# Patient Record
Sex: FEMALE | Race: WHITE | Employment: FULL TIME | URBAN - METROPOLITAN AREA
[De-identification: names, ages, dates, MRNs, and addresses within clinical notes are randomized per-mention and may not be internally consistent; named-entity substitution may affect disease eponyms.]

---

## 2021-11-03 ENCOUNTER — APPOINTMENT (OUTPATIENT)
Dept: CT IMAGING | Age: 36
End: 2021-11-03
Payer: COMMERCIAL

## 2021-11-03 ENCOUNTER — HOSPITAL ENCOUNTER (OUTPATIENT)
Age: 36
Setting detail: OBSERVATION
Discharge: HOME | End: 2021-11-04
Attending: EMERGENCY MEDICINE | Admitting: SURGERY
Payer: COMMERCIAL

## 2021-11-03 DIAGNOSIS — K35.80 ACUTE APPENDICITIS, UNSPECIFIED ACUTE APPENDICITIS TYPE: ICD-10-CM

## 2021-11-03 DIAGNOSIS — K35.80 ACUTE APPENDICITIS WITHOUT PERITONITIS: Primary | ICD-10-CM

## 2021-11-03 LAB
A/G RATIO: 1.4 (ref 1.1–2.2)
ALBUMIN SERPL-MCNC: 4.4 G/DL (ref 3.4–5)
ALP BLD-CCNC: 49 U/L (ref 40–129)
ALT SERPL-CCNC: 15 U/L (ref 10–40)
ANION GAP SERPL CALCULATED.3IONS-SCNC: 9 MMOL/L (ref 3–16)
AST SERPL-CCNC: 15 U/L (ref 15–37)
BASOPHILS ABSOLUTE: 0 K/UL (ref 0–0.2)
BASOPHILS RELATIVE PERCENT: 0.2 %
BILIRUB SERPL-MCNC: 0.3 MG/DL (ref 0–1)
BILIRUBIN URINE: NEGATIVE
BLOOD, URINE: NEGATIVE
BUN BLDV-MCNC: 12 MG/DL (ref 7–20)
CALCIUM SERPL-MCNC: 9.7 MG/DL (ref 8.3–10.6)
CHLORIDE BLD-SCNC: 101 MMOL/L (ref 99–110)
CLARITY: ABNORMAL
CO2: 27 MMOL/L (ref 21–32)
COLOR: YELLOW
CREAT SERPL-MCNC: 0.8 MG/DL (ref 0.6–1.1)
EOSINOPHILS ABSOLUTE: 0 K/UL (ref 0–0.6)
EOSINOPHILS RELATIVE PERCENT: 0.2 %
GFR AFRICAN AMERICAN: >60
GFR NON-AFRICAN AMERICAN: >60
GLUCOSE BLD-MCNC: 144 MG/DL (ref 70–99)
GLUCOSE URINE: NEGATIVE MG/DL
HCG QUALITATIVE: NEGATIVE
HCT VFR BLD CALC: 39.1 % (ref 36–48)
HEMOGLOBIN: 13.2 G/DL (ref 12–16)
KETONES, URINE: NEGATIVE MG/DL
LEUKOCYTE ESTERASE, URINE: NEGATIVE
LIPASE: 26 U/L (ref 13–60)
LYMPHOCYTES ABSOLUTE: 1.3 K/UL (ref 1–5.1)
LYMPHOCYTES RELATIVE PERCENT: 8.6 %
MCH RBC QN AUTO: 29.9 PG (ref 26–34)
MCHC RBC AUTO-ENTMCNC: 33.8 G/DL (ref 31–36)
MCV RBC AUTO: 88.4 FL (ref 80–100)
MICROSCOPIC EXAMINATION: ABNORMAL
MONOCYTES ABSOLUTE: 0.7 K/UL (ref 0–1.3)
MONOCYTES RELATIVE PERCENT: 4.5 %
NEUTROPHILS ABSOLUTE: 13.1 K/UL (ref 1.7–7.7)
NEUTROPHILS RELATIVE PERCENT: 86.5 %
NITRITE, URINE: NEGATIVE
PDW BLD-RTO: 13.9 % (ref 12.4–15.4)
PH UA: 6.5 (ref 5–8)
PLATELET # BLD: 295 K/UL (ref 135–450)
PMV BLD AUTO: 8.3 FL (ref 5–10.5)
POTASSIUM SERPL-SCNC: 3.8 MMOL/L (ref 3.5–5.1)
PROTEIN UA: NEGATIVE MG/DL
RBC # BLD: 4.43 M/UL (ref 4–5.2)
SODIUM BLD-SCNC: 137 MMOL/L (ref 136–145)
SPECIFIC GRAVITY UA: 1.01 (ref 1–1.03)
TOTAL PROTEIN: 7.5 G/DL (ref 6.4–8.2)
URINE TYPE: ABNORMAL
UROBILINOGEN, URINE: 0.2 E.U./DL
WBC # BLD: 15.2 K/UL (ref 4–11)

## 2021-11-03 PROCEDURE — 96361 HYDRATE IV INFUSION ADD-ON: CPT

## 2021-11-03 PROCEDURE — 81003 URINALYSIS AUTO W/O SCOPE: CPT

## 2021-11-03 PROCEDURE — G0378 HOSPITAL OBSERVATION PER HR: HCPCS

## 2021-11-03 PROCEDURE — 96365 THER/PROPH/DIAG IV INF INIT: CPT

## 2021-11-03 PROCEDURE — 96376 TX/PRO/DX INJ SAME DRUG ADON: CPT

## 2021-11-03 PROCEDURE — 99283 EMERGENCY DEPT VISIT LOW MDM: CPT

## 2021-11-03 PROCEDURE — 84703 CHORIONIC GONADOTROPIN ASSAY: CPT

## 2021-11-03 PROCEDURE — 2580000003 HC RX 258: Performed by: EMERGENCY MEDICINE

## 2021-11-03 PROCEDURE — 99220 PR INITIAL OBSERVATION CARE/DAY 70 MINUTES: CPT | Performed by: SURGERY

## 2021-11-03 PROCEDURE — 74177 CT ABD & PELVIS W/CONTRAST: CPT

## 2021-11-03 PROCEDURE — 83690 ASSAY OF LIPASE: CPT

## 2021-11-03 PROCEDURE — 6360000002 HC RX W HCPCS: Performed by: SURGERY

## 2021-11-03 PROCEDURE — 85025 COMPLETE CBC W/AUTO DIFF WBC: CPT

## 2021-11-03 PROCEDURE — 6360000002 HC RX W HCPCS: Performed by: EMERGENCY MEDICINE

## 2021-11-03 PROCEDURE — 96375 TX/PRO/DX INJ NEW DRUG ADDON: CPT

## 2021-11-03 PROCEDURE — 6360000004 HC RX CONTRAST MEDICATION: Performed by: EMERGENCY MEDICINE

## 2021-11-03 PROCEDURE — 80053 COMPREHEN METABOLIC PANEL: CPT

## 2021-11-03 RX ORDER — SODIUM CHLORIDE 0.9 % (FLUSH) 0.9 %
5-40 SYRINGE (ML) INJECTION PRN
Status: DISCONTINUED | OUTPATIENT
Start: 2021-11-03 | End: 2021-11-04 | Stop reason: HOSPADM

## 2021-11-03 RX ORDER — SODIUM CHLORIDE 0.9 % (FLUSH) 0.9 %
5-40 SYRINGE (ML) INJECTION EVERY 12 HOURS SCHEDULED
Status: DISCONTINUED | OUTPATIENT
Start: 2021-11-04 | End: 2021-11-04 | Stop reason: HOSPADM

## 2021-11-03 RX ORDER — MORPHINE SULFATE 4 MG/ML
4 INJECTION, SOLUTION INTRAMUSCULAR; INTRAVENOUS ONCE
Status: COMPLETED | OUTPATIENT
Start: 2021-11-03 | End: 2021-11-03

## 2021-11-03 RX ORDER — ONDANSETRON 2 MG/ML
4 INJECTION INTRAMUSCULAR; INTRAVENOUS EVERY 6 HOURS PRN
Status: DISCONTINUED | OUTPATIENT
Start: 2021-11-03 | End: 2021-11-04 | Stop reason: HOSPADM

## 2021-11-03 RX ORDER — ONDANSETRON 4 MG/1
4 TABLET, ORALLY DISINTEGRATING ORAL EVERY 8 HOURS PRN
Status: DISCONTINUED | OUTPATIENT
Start: 2021-11-03 | End: 2021-11-04 | Stop reason: HOSPADM

## 2021-11-03 RX ORDER — SODIUM CHLORIDE 9 MG/ML
25 INJECTION, SOLUTION INTRAVENOUS PRN
Status: DISCONTINUED | OUTPATIENT
Start: 2021-11-03 | End: 2021-11-04 | Stop reason: HOSPADM

## 2021-11-03 RX ORDER — 0.9 % SODIUM CHLORIDE 0.9 %
1000 INTRAVENOUS SOLUTION INTRAVENOUS ONCE
Status: COMPLETED | OUTPATIENT
Start: 2021-11-03 | End: 2021-11-03

## 2021-11-03 RX ORDER — ONDANSETRON 2 MG/ML
4 INJECTION INTRAMUSCULAR; INTRAVENOUS ONCE
Status: COMPLETED | OUTPATIENT
Start: 2021-11-03 | End: 2021-11-03

## 2021-11-03 RX ADMIN — SODIUM CHLORIDE 1000 ML: 9 INJECTION, SOLUTION INTRAVENOUS at 19:16

## 2021-11-03 RX ADMIN — MORPHINE SULFATE 4 MG: 4 INJECTION INTRAVENOUS at 20:57

## 2021-11-03 RX ADMIN — MORPHINE SULFATE 4 MG: 4 INJECTION INTRAVENOUS at 19:17

## 2021-11-03 RX ADMIN — HYDROMORPHONE HYDROCHLORIDE 0.5 MG: 1 INJECTION, SOLUTION INTRAMUSCULAR; INTRAVENOUS; SUBCUTANEOUS at 23:56

## 2021-11-03 RX ADMIN — ONDANSETRON 4 MG: 2 INJECTION INTRAMUSCULAR; INTRAVENOUS at 19:17

## 2021-11-03 RX ADMIN — PIPERACILLIN AND TAZOBACTAM 3375 MG: 3; .375 INJECTION, POWDER, LYOPHILIZED, FOR SOLUTION INTRAVENOUS at 20:56

## 2021-11-03 RX ADMIN — IOPAMIDOL 80 ML: 755 INJECTION, SOLUTION INTRAVENOUS at 20:19

## 2021-11-03 ASSESSMENT — PAIN DESCRIPTION - LOCATION
LOCATION: ABDOMEN
LOCATION: ABDOMEN

## 2021-11-03 ASSESSMENT — PAIN DESCRIPTION - PAIN TYPE
TYPE: ACUTE PAIN
TYPE: ACUTE PAIN

## 2021-11-03 ASSESSMENT — PAIN SCALES - GENERAL
PAINLEVEL_OUTOF10: 8
PAINLEVEL_OUTOF10: 4
PAINLEVEL_OUTOF10: 4
PAINLEVEL_OUTOF10: 8
PAINLEVEL_OUTOF10: 8
PAINLEVEL_OUTOF10: 7
PAINLEVEL_OUTOF10: 6

## 2021-11-03 ASSESSMENT — PAIN DESCRIPTION - DESCRIPTORS
DESCRIPTORS: STABBING;PINS AND NEEDLES
DESCRIPTORS: SHARP;STABBING

## 2021-11-03 ASSESSMENT — PAIN DESCRIPTION - FREQUENCY: FREQUENCY: CONTINUOUS

## 2021-11-03 ASSESSMENT — PAIN DESCRIPTION - PROGRESSION: CLINICAL_PROGRESSION: NOT CHANGED

## 2021-11-03 ASSESSMENT — PAIN DESCRIPTION - ONSET: ONSET: ON-GOING

## 2021-11-03 ASSESSMENT — PAIN - FUNCTIONAL ASSESSMENT: PAIN_FUNCTIONAL_ASSESSMENT: ACTIVITIES ARE NOT PREVENTED

## 2021-11-03 ASSESSMENT — PAIN DESCRIPTION - ORIENTATION
ORIENTATION: RIGHT;MID
ORIENTATION: UPPER;LEFT;RIGHT

## 2021-11-03 ASSESSMENT — PAIN DESCRIPTION - DIRECTION: RADIATING_TOWARDS: ALL OVER ABD

## 2021-11-03 NOTE — ED PROVIDER NOTES
of Exercise per Week:     Minutes of Exercise per Session:    Stress:     Feeling of Stress :    Social Connections:     Frequency of Communication with Friends and Family:     Frequency of Social Gatherings with Friends and Family:     Attends Church Services:     Active Member of Clubs or Organizations:     Attends Club or Organization Meetings:     Marital Status:    Intimate Partner Violence:     Fear of Current or Ex-Partner:     Emotionally Abused:     Physically Abused:     Sexually Abused:        Nursing Notes Reviewed      ROS:  GENERAL:  No fever, no chills, no diaphoresis, no appetite changes  EYES: no eye discharge, no eye redness, no visual changes  ENT: no nasal congestion, no sore throat  CARDIAC: no chest pain, no palpitations, no leg swelling  PULM: no cough, no shortness of breath  ABD: no abdominal pain, no nausea, no vomiting, no diarrhea, no melena or hematochezia  : no dysuria, no hematuria, no urgency, no frequency. No flank pain  MUSCULOSKELETAL: no back pain, no arthralgias, no myalgias  NEURO: no headache, no lightheadedness, no dizziness, no numbness, no weakness, no syncope, no confusion, no speech difficulty  SKIN: no rashes, no erythema, no wounds, no ecchymosis      PHYSICAL EXAM:  GENERAL APPEARANCE: [place] is in no acute respiratory distress. Awake and alert. VITAL SIGNS:   ED Triage Vitals [11/03/21 1839]   Enc Vitals Group      /64      Pulse 63      Resp 14      Temp 98.3 °F (36.8 °C)      Temp Source Oral      SpO2 100 %      Weight 138 lb 3.2 oz (62.7 kg)      Height 5' 8\" (1.727 m)      Head Circumference       Peak Flow       Pain Score       Pain Loc       Pain Edu? Excl. in [address]? HEAD: Normocephalic, atraumatic. EYES:  Extraocular muscles are intact. Pupils equal round and reactive to light. Conjunctivas are pink. Negative scleral icterus. ENT:  Mucous membranes are moist.  Pharynx without erythema or exudates.    NECK: Nontender and supple. No cervical adenopathy. CHEST:  Clear to auscultation bilaterally. No rales, rhonchi, or wheezing. HEART:  Regular rate and regular rhythm. No murmurs. Strong and equal pulses in the upper and lower extremities. ABDOMEN: Soft,  nondistended, positive bowel sounds. abdomen is tender in RLQ greater than upper abdomen. No rebound. no guarding. MUSCULOSKELETAL: The calves are nontender to palpation. Active range of motion of the upper and lower extremities. No edema. NEUROLOGICAL: Awake, alert and oriented x 3. Power intact in the upper and lower extremities. DERMATOLOGIC: No petechiae, rashes, or ecchymoses. No erythema. PSYCH: normal mood and affect. Normal thought content. ED COURSE AND MEDICAL DECISION MAKING:      Radiology:  Films have been read by radiologist as noted in chart unless otherwise stated. Other radiologic studies (i.e. CT, MRI, ultrasounds, etc ) have been interpreted by radiologist.     CT ABDOMEN PELVIS W IV CONTRAST Additional Contrast? None   Final Result      1. Evidence of appendicitis. 2. Retroverted uterus with prominent thickened endometrium. 3. Trace pelvic fluid. CT ABDOMEN PELVIS W IV CONTRAST Additional Contrast? None    Result Date: 11/3/2021  DATE: 11/3/2021 EXAM: CT ABDOMEN PELVIS W IV CONTRAST INDICATION: abd pain, upper, now localizing more to RLQ, Pain COMPARISON: None. TECHNIQUE: Axial CT imaging obtained from lung bases through pelvis. Axial images and multiplanar reformatted images are provided for review. Individualized dose optimization technique was used in order to meet ALARA standards for radiation dose reduction. In addition to vendor specific dose reduction algorithms, the dose reduction techniques vary based on the specific scanner utilized but frequently include automated exposure control, adjustment of the mA and/or kV according to patient size, and use of iterative reconstruction technique.  IV Contrast: 80 mL Isovue-370 Oral Contrast: No FINDINGS: LUNG BASES: Clear. LIVER: Normal. GALLBLADDER AND BILIARY TREE: No calcified gallstones. No gallbladder distention. No intra- or extrahepatic biliary dilatation. PANCREAS: Normal. SPLEEN: Normal. ADRENAL GLANDS: Normal. KIDNEYS AND URETERS: Symmetrical enhancement. No hydronephrosis. URINARY BLADDER: Normal. REPRODUCTIVE ORGANS: Uterus is retroverted. Prominent thickened endometrium. BOWEL: No bowel dilatation. No evidence of obstruction. The appendix is mildly dilated and thickened, measuring up to 9 mm in diameter, with mild surrounding inflammatory density. No fluid collection or free air LYMPH NODES: No abnormally enlarged nodes. PERITONEUM/RETROPERITONEUM: Trace amount of pelvic fluid. VESSELS: Aorta is normal caliber and proximal branch vessels are patent. ABDOMINAL WALL: Normal. BONES: No destructive process. 1. Evidence of appendicitis. 2. Retroverted uterus with prominent thickened endometrium. 3. Trace pelvic fluid.        Labs:  Results for orders placed or performed during the hospital encounter of 11/03/21   CBC Auto Differential   Result Value Ref Range    WBC 15.2 (H) 4.0 - 11.0 K/uL    RBC 4.43 4.00 - 5.20 M/uL    Hemoglobin 13.2 12.0 - 16.0 g/dL    Hematocrit 39.1 36.0 - 48.0 %    MCV 88.4 80.0 - 100.0 fL    MCH 29.9 26.0 - 34.0 pg    MCHC 33.8 31.0 - 36.0 g/dL    RDW 13.9 12.4 - 15.4 %    Platelets 484 494 - 661 K/uL    MPV 8.3 5.0 - 10.5 fL    Neutrophils % 86.5 %    Lymphocytes % 8.6 %    Monocytes % 4.5 %    Eosinophils % 0.2 %    Basophils % 0.2 %    Neutrophils Absolute 13.1 (H) 1.7 - 7.7 K/uL    Lymphocytes Absolute 1.3 1.0 - 5.1 K/uL    Monocytes Absolute 0.7 0.0 - 1.3 K/uL    Eosinophils Absolute 0.0 0.0 - 0.6 K/uL    Basophils Absolute 0.0 0.0 - 0.2 K/uL   Lipase   Result Value Ref Range    Lipase 26.0 13.0 - 60.0 U/L   Comprehensive Metabolic Panel   Result Value Ref Range    Sodium 137 136 - 145 mmol/L    Potassium 3.8 3.5 - 5.1 mmol/L    Chloride 101 99 - 110 mmol/L    CO2 27 21 - 32 mmol/L    Anion Gap 9 3 - 16    Glucose 144 (H) 70 - 99 mg/dL    BUN 12 7 - 20 mg/dL    CREATININE 0.8 0.6 - 1.1 mg/dL    GFR Non-African American >60 >60    GFR African American >60 >60    Calcium 9.7 8.3 - 10.6 mg/dL    Total Protein 7.5 6.4 - 8.2 g/dL    Albumin 4.4 3.4 - 5.0 g/dL    Albumin/Globulin Ratio 1.4 1.1 - 2.2    Total Bilirubin 0.3 0.0 - 1.0 mg/dL    Alkaline Phosphatase 49 40 - 129 U/L    ALT 15 10 - 40 U/L    AST 15 15 - 37 U/L   Urinalysis   Result Value Ref Range    Color, UA Yellow Straw/Yellow    Clarity, UA SL CLOUDY (A) Clear    Glucose, Ur Negative Negative mg/dL    Bilirubin Urine Negative Negative    Ketones, Urine Negative Negative mg/dL    Specific Gravity, UA 1.015 1.005 - 1.030    Blood, Urine Negative Negative    pH, UA 6.5 5.0 - 8.0    Protein, UA Negative Negative mg/dL    Urobilinogen, Urine 0.2 <2.0 E.U./dL    Nitrite, Urine Negative Negative    Leukocyte Esterase, Urine Negative Negative    Microscopic Examination Not Indicated     Urine Type NotGiven    HCG Qualitative, Serum   Result Value Ref Range    hCG Qual Negative Detects HCG level >10 MIU/mL       Treatment in the department:  Patient received the following while in the ED. Medications   morphine injection 4 mg (4 mg IntraVENous Given 11/3/21 1917)   ondansetron (ZOFRAN) injection 4 mg (4 mg IntraVENous Given 11/3/21 1917)   0.9 % sodium chloride bolus (0 mLs IntraVENous Stopped 11/3/21 2016)   iopamidol (ISOVUE-370) 76 % injection 80 mL (80 mLs IntraVENous Given 11/3/21 2019)   piperacillin-tazobactam (ZOSYN) 3,375 mg in dextrose 5 % 50 mL IVPB (mini-bag) (3,375 mg IntraVENous New Bag 11/3/21 2056)   morphine injection 4 mg (4 mg IntraVENous Given 11/3/21 2057)         Medical decision making:  Patient presents with 1 day history of abdominal pain. This seemed to start in the upper abdomen but now localizing to the right lower quadrant. She is afebrile but does have leukocytosis.   CT imaging did show signs of acute appendicitis without perforation or obstruction or free air. Patient was started on empiric antibiotics and kept n.p.o.  Surgery was contacted. I spoke with Dr. Freda Yung. We thoroughly discussed the history, physical exam, laboratory and imaging studies, as well as, emergency department course. Based upon that discussion, we've decided to admit Anayeli Brandon for further observation and evaluation of Beryan Hernandesal Tyrghulam's abdominal pain. As I have deemed necessary from their history, physical and studies, I have considered and evaluated Anayeli Brandon for the following diagnoses:  ACUTE APPENDICITIS, BOWEL OBSTRUCTION, CHOLECYSTITIS, DIVERTICULITIS, INCARCERATED HERNIA, PANCREATITIS, or PERFORATED BOWEL or ULCER, AAA, OBSTRUCTIVE UROPATHY, ISCHEMIC COLITIS. Clinical Impression:  1. Acute appendicitis without peritonitis        Dispo:  Patient will be admitted at this time. Patient was informed of this decision and agrees with plan. I have discussed lab and xray findings with patient and they understand. Questions were answered to the best of my ability. Discharge vitals:  Blood pressure 109/61, pulse 68, temperature 98.3 °F (36.8 °C), temperature source Oral, resp. rate 12, height 5' 8\" (1.727 m), weight 138 lb 3.2 oz (62.7 kg), last menstrual period 09/03/2021, SpO2 99 %. Prescriptions given:   New Prescriptions    No medications on file       This chart was created using Dragon voice recognition software.         Amrit Long MD  11/03/21 3703

## 2021-11-04 ENCOUNTER — ANESTHESIA EVENT (OUTPATIENT)
Dept: OPERATING ROOM | Age: 36
End: 2021-11-04
Payer: COMMERCIAL

## 2021-11-04 ENCOUNTER — ANESTHESIA (OUTPATIENT)
Dept: OPERATING ROOM | Age: 36
End: 2021-11-04
Payer: COMMERCIAL

## 2021-11-04 VITALS — DIASTOLIC BLOOD PRESSURE: 55 MMHG | OXYGEN SATURATION: 94 % | SYSTOLIC BLOOD PRESSURE: 117 MMHG | TEMPERATURE: 97.5 F

## 2021-11-04 VITALS
DIASTOLIC BLOOD PRESSURE: 70 MMHG | TEMPERATURE: 98 F | RESPIRATION RATE: 14 BRPM | BODY MASS INDEX: 21.18 KG/M2 | HEART RATE: 77 BPM | HEIGHT: 68 IN | WEIGHT: 139.77 LBS | OXYGEN SATURATION: 98 % | SYSTOLIC BLOOD PRESSURE: 116 MMHG

## 2021-11-04 LAB
ABO/RH: NORMAL
ALBUMIN SERPL-MCNC: 3.7 G/DL (ref 3.4–5)
ANION GAP SERPL CALCULATED.3IONS-SCNC: 9 MMOL/L (ref 3–16)
ANTIBODY SCREEN: NORMAL
BASOPHILS ABSOLUTE: 0 K/UL (ref 0–0.2)
BASOPHILS RELATIVE PERCENT: 0.3 %
BUN BLDV-MCNC: 8 MG/DL (ref 7–20)
CALCIUM SERPL-MCNC: 8.9 MG/DL (ref 8.3–10.6)
CHLORIDE BLD-SCNC: 101 MMOL/L (ref 99–110)
CO2: 26 MMOL/L (ref 21–32)
CREAT SERPL-MCNC: 0.8 MG/DL (ref 0.6–1.1)
EOSINOPHILS ABSOLUTE: 0.1 K/UL (ref 0–0.6)
EOSINOPHILS RELATIVE PERCENT: 0.4 %
GFR AFRICAN AMERICAN: >60
GFR NON-AFRICAN AMERICAN: >60
GLUCOSE BLD-MCNC: 102 MG/DL (ref 70–99)
HCG(URINE) PREGNANCY TEST: NEGATIVE
HCT VFR BLD CALC: 36.6 % (ref 36–48)
HEMOGLOBIN: 12.2 G/DL (ref 12–16)
INR BLD: 1.03 (ref 0.88–1.12)
LYMPHOCYTES ABSOLUTE: 1.7 K/UL (ref 1–5.1)
LYMPHOCYTES RELATIVE PERCENT: 13.2 %
MAGNESIUM: 1.9 MG/DL (ref 1.8–2.4)
MCH RBC QN AUTO: 30 PG (ref 26–34)
MCHC RBC AUTO-ENTMCNC: 33.3 G/DL (ref 31–36)
MCV RBC AUTO: 90.2 FL (ref 80–100)
MONOCYTES ABSOLUTE: 0.8 K/UL (ref 0–1.3)
MONOCYTES RELATIVE PERCENT: 6.6 %
NEUTROPHILS ABSOLUTE: 10.2 K/UL (ref 1.7–7.7)
NEUTROPHILS RELATIVE PERCENT: 79.5 %
PDW BLD-RTO: 13.9 % (ref 12.4–15.4)
PHOSPHORUS: 4.2 MG/DL (ref 2.5–4.9)
PLATELET # BLD: 230 K/UL (ref 135–450)
PMV BLD AUTO: 8.7 FL (ref 5–10.5)
POTASSIUM SERPL-SCNC: 4.3 MMOL/L (ref 3.5–5.1)
PROTHROMBIN TIME: 11.6 SEC (ref 9.9–12.7)
RBC # BLD: 4.06 M/UL (ref 4–5.2)
SODIUM BLD-SCNC: 136 MMOL/L (ref 136–145)
WBC # BLD: 12.9 K/UL (ref 4–11)

## 2021-11-04 PROCEDURE — G0378 HOSPITAL OBSERVATION PER HR: HCPCS

## 2021-11-04 PROCEDURE — 3700000001 HC ADD 15 MINUTES (ANESTHESIA): Performed by: SURGERY

## 2021-11-04 PROCEDURE — 3600000014 HC SURGERY LEVEL 4 ADDTL 15MIN: Performed by: SURGERY

## 2021-11-04 PROCEDURE — 80069 RENAL FUNCTION PANEL: CPT

## 2021-11-04 PROCEDURE — 83735 ASSAY OF MAGNESIUM: CPT

## 2021-11-04 PROCEDURE — 2720000010 HC SURG SUPPLY STERILE: Performed by: SURGERY

## 2021-11-04 PROCEDURE — 6360000002 HC RX W HCPCS: Performed by: ANESTHESIOLOGY

## 2021-11-04 PROCEDURE — 2500000003 HC RX 250 WO HCPCS: Performed by: NURSE ANESTHETIST, CERTIFIED REGISTERED

## 2021-11-04 PROCEDURE — 36415 COLL VENOUS BLD VENIPUNCTURE: CPT

## 2021-11-04 PROCEDURE — 84703 CHORIONIC GONADOTROPIN ASSAY: CPT

## 2021-11-04 PROCEDURE — 2580000003 HC RX 258: Performed by: SURGERY

## 2021-11-04 PROCEDURE — 2709999900 HC NON-CHARGEABLE SUPPLY: Performed by: SURGERY

## 2021-11-04 PROCEDURE — 6360000002 HC RX W HCPCS: Performed by: STUDENT IN AN ORGANIZED HEALTH CARE EDUCATION/TRAINING PROGRAM

## 2021-11-04 PROCEDURE — 44970 LAPAROSCOPY APPENDECTOMY: CPT | Performed by: SURGERY

## 2021-11-04 PROCEDURE — 6360000002 HC RX W HCPCS: Performed by: NURSE ANESTHETIST, CERTIFIED REGISTERED

## 2021-11-04 PROCEDURE — 3700000000 HC ANESTHESIA ATTENDED CARE: Performed by: SURGERY

## 2021-11-04 PROCEDURE — 3600000004 HC SURGERY LEVEL 4 BASE: Performed by: SURGERY

## 2021-11-04 PROCEDURE — 6360000002 HC RX W HCPCS: Performed by: SURGERY

## 2021-11-04 PROCEDURE — 86900 BLOOD TYPING SEROLOGIC ABO: CPT

## 2021-11-04 PROCEDURE — 85025 COMPLETE CBC W/AUTO DIFF WBC: CPT

## 2021-11-04 PROCEDURE — 6370000000 HC RX 637 (ALT 250 FOR IP): Performed by: STUDENT IN AN ORGANIZED HEALTH CARE EDUCATION/TRAINING PROGRAM

## 2021-11-04 PROCEDURE — 7100000000 HC PACU RECOVERY - FIRST 15 MIN: Performed by: SURGERY

## 2021-11-04 PROCEDURE — 88304 TISSUE EXAM BY PATHOLOGIST: CPT

## 2021-11-04 PROCEDURE — 7100000001 HC PACU RECOVERY - ADDTL 15 MIN: Performed by: SURGERY

## 2021-11-04 PROCEDURE — 86850 RBC ANTIBODY SCREEN: CPT

## 2021-11-04 PROCEDURE — 86901 BLOOD TYPING SEROLOGIC RH(D): CPT

## 2021-11-04 PROCEDURE — 85610 PROTHROMBIN TIME: CPT

## 2021-11-04 RX ORDER — SODIUM CHLORIDE, SODIUM LACTATE, POTASSIUM CHLORIDE, CALCIUM CHLORIDE 600; 310; 30; 20 MG/100ML; MG/100ML; MG/100ML; MG/100ML
INJECTION, SOLUTION INTRAVENOUS CONTINUOUS
Status: DISCONTINUED | OUTPATIENT
Start: 2021-11-04 | End: 2021-11-04

## 2021-11-04 RX ORDER — OXYCODONE HYDROCHLORIDE 5 MG/1
5 TABLET ORAL EVERY 6 HOURS PRN
Qty: 20 TABLET | Refills: 0 | Status: SHIPPED | OUTPATIENT
Start: 2021-11-04 | End: 2021-11-04 | Stop reason: SDUPTHER

## 2021-11-04 RX ORDER — HYDROCODONE BITARTRATE AND ACETAMINOPHEN 5; 325 MG/1; MG/1
1 TABLET ORAL
Status: DISCONTINUED | OUTPATIENT
Start: 2021-11-04 | End: 2021-11-04

## 2021-11-04 RX ORDER — ROCURONIUM BROMIDE 10 MG/ML
INJECTION, SOLUTION INTRAVENOUS PRN
Status: DISCONTINUED | OUTPATIENT
Start: 2021-11-04 | End: 2021-11-04 | Stop reason: SDUPTHER

## 2021-11-04 RX ORDER — ACETAMINOPHEN 500 MG
1000 TABLET ORAL EVERY 6 HOURS SCHEDULED
Status: DISCONTINUED | OUTPATIENT
Start: 2021-11-04 | End: 2021-11-04 | Stop reason: HOSPADM

## 2021-11-04 RX ORDER — ONDANSETRON 2 MG/ML
INJECTION INTRAMUSCULAR; INTRAVENOUS PRN
Status: DISCONTINUED | OUTPATIENT
Start: 2021-11-04 | End: 2021-11-04 | Stop reason: SDUPTHER

## 2021-11-04 RX ORDER — 0.9 % SODIUM CHLORIDE 0.9 %
500 INTRAVENOUS SOLUTION INTRAVENOUS
Status: DISCONTINUED | OUTPATIENT
Start: 2021-11-04 | End: 2021-11-04

## 2021-11-04 RX ORDER — OXYCODONE HYDROCHLORIDE 5 MG/1
5 TABLET ORAL EVERY 6 HOURS PRN
Qty: 20 TABLET | Refills: 0 | Status: SHIPPED | OUTPATIENT
Start: 2021-11-04 | End: 2021-11-11

## 2021-11-04 RX ORDER — MAGNESIUM SULFATE IN WATER 40 MG/ML
2000 INJECTION, SOLUTION INTRAVENOUS ONCE
Status: COMPLETED | OUTPATIENT
Start: 2021-11-04 | End: 2021-11-04

## 2021-11-04 RX ORDER — DOCUSATE SODIUM 100 MG/1
100 CAPSULE, LIQUID FILLED ORAL 2 TIMES DAILY
Qty: 30 CAPSULE | Refills: 0 | Status: SHIPPED | OUTPATIENT
Start: 2021-11-04 | End: 2021-11-04 | Stop reason: SDUPTHER

## 2021-11-04 RX ORDER — MIDAZOLAM HYDROCHLORIDE 1 MG/ML
INJECTION INTRAMUSCULAR; INTRAVENOUS PRN
Status: DISCONTINUED | OUTPATIENT
Start: 2021-11-04 | End: 2021-11-04 | Stop reason: SDUPTHER

## 2021-11-04 RX ORDER — OXYCODONE HYDROCHLORIDE 5 MG/1
10 TABLET ORAL EVERY 4 HOURS PRN
Status: DISCONTINUED | OUTPATIENT
Start: 2021-11-04 | End: 2021-11-04 | Stop reason: HOSPADM

## 2021-11-04 RX ORDER — OXYCODONE HYDROCHLORIDE 5 MG/1
5 TABLET ORAL EVERY 4 HOURS PRN
Status: DISCONTINUED | OUTPATIENT
Start: 2021-11-04 | End: 2021-11-04 | Stop reason: HOSPADM

## 2021-11-04 RX ORDER — PROCHLORPERAZINE EDISYLATE 5 MG/ML
5 INJECTION INTRAMUSCULAR; INTRAVENOUS
Status: DISCONTINUED | OUTPATIENT
Start: 2021-11-04 | End: 2021-11-04

## 2021-11-04 RX ORDER — MAGNESIUM HYDROXIDE 1200 MG/15ML
LIQUID ORAL CONTINUOUS PRN
Status: COMPLETED | OUTPATIENT
Start: 2021-11-04 | End: 2021-11-04

## 2021-11-04 RX ORDER — PROPOFOL 10 MG/ML
INJECTION, EMULSION INTRAVENOUS PRN
Status: DISCONTINUED | OUTPATIENT
Start: 2021-11-04 | End: 2021-11-04 | Stop reason: SDUPTHER

## 2021-11-04 RX ORDER — DEXAMETHASONE SODIUM PHOSPHATE 4 MG/ML
INJECTION, SOLUTION INTRA-ARTICULAR; INTRALESIONAL; INTRAMUSCULAR; INTRAVENOUS; SOFT TISSUE PRN
Status: DISCONTINUED | OUTPATIENT
Start: 2021-11-04 | End: 2021-11-04 | Stop reason: SDUPTHER

## 2021-11-04 RX ORDER — ONDANSETRON 2 MG/ML
4 INJECTION INTRAMUSCULAR; INTRAVENOUS
Status: DISCONTINUED | OUTPATIENT
Start: 2021-11-04 | End: 2021-11-04

## 2021-11-04 RX ORDER — DIPHENHYDRAMINE HYDROCHLORIDE 50 MG/ML
12.5 INJECTION INTRAMUSCULAR; INTRAVENOUS
Status: DISCONTINUED | OUTPATIENT
Start: 2021-11-04 | End: 2021-11-04

## 2021-11-04 RX ORDER — MEPERIDINE HYDROCHLORIDE 25 MG/ML
12.5 INJECTION INTRAMUSCULAR; INTRAVENOUS; SUBCUTANEOUS EVERY 5 MIN PRN
Status: DISCONTINUED | OUTPATIENT
Start: 2021-11-04 | End: 2021-11-04

## 2021-11-04 RX ORDER — HYDRALAZINE HYDROCHLORIDE 20 MG/ML
5 INJECTION INTRAMUSCULAR; INTRAVENOUS EVERY 10 MIN PRN
Status: DISCONTINUED | OUTPATIENT
Start: 2021-11-04 | End: 2021-11-04

## 2021-11-04 RX ORDER — DOCUSATE SODIUM 100 MG/1
100 CAPSULE, LIQUID FILLED ORAL 2 TIMES DAILY
Qty: 30 CAPSULE | Refills: 0 | Status: SHIPPED | OUTPATIENT
Start: 2021-11-04

## 2021-11-04 RX ORDER — FENTANYL CITRATE 50 UG/ML
INJECTION, SOLUTION INTRAMUSCULAR; INTRAVENOUS PRN
Status: DISCONTINUED | OUTPATIENT
Start: 2021-11-04 | End: 2021-11-04 | Stop reason: SDUPTHER

## 2021-11-04 RX ADMIN — ACETAMINOPHEN 1000 MG: 500 TABLET, FILM COATED ORAL at 10:55

## 2021-11-04 RX ADMIN — MAGNESIUM SULFATE HEPTAHYDRATE 2000 MG: 2 INJECTION, SOLUTION INTRAVENOUS at 07:05

## 2021-11-04 RX ADMIN — MIDAZOLAM HYDROCHLORIDE 2 MG: 2 INJECTION, SOLUTION INTRAMUSCULAR; INTRAVENOUS at 07:53

## 2021-11-04 RX ADMIN — DEXAMETHASONE SODIUM PHOSPHATE 4 MG: 4 INJECTION, SOLUTION INTRAMUSCULAR; INTRAVENOUS at 08:03

## 2021-11-04 RX ADMIN — HYDROMORPHONE HYDROCHLORIDE 0.25 MG: 1 INJECTION, SOLUTION INTRAMUSCULAR; INTRAVENOUS; SUBCUTANEOUS at 09:05

## 2021-11-04 RX ADMIN — FENTANYL CITRATE 50 MCG: 50 INJECTION, SOLUTION INTRAMUSCULAR; INTRAVENOUS at 08:03

## 2021-11-04 RX ADMIN — HYDROMORPHONE HYDROCHLORIDE 0.5 MG: 1 INJECTION, SOLUTION INTRAMUSCULAR; INTRAVENOUS; SUBCUTANEOUS at 06:57

## 2021-11-04 RX ADMIN — PIPERACILLIN AND TAZOBACTAM 3375 MG: 3; .375 INJECTION, POWDER, LYOPHILIZED, FOR SOLUTION INTRAVENOUS at 05:05

## 2021-11-04 RX ADMIN — HYDROMORPHONE HYDROCHLORIDE 0.25 MG: 1 INJECTION, SOLUTION INTRAMUSCULAR; INTRAVENOUS; SUBCUTANEOUS at 09:10

## 2021-11-04 RX ADMIN — PROPOFOL 200 MG: 10 INJECTION, EMULSION INTRAVENOUS at 07:53

## 2021-11-04 RX ADMIN — SODIUM CHLORIDE, POTASSIUM CHLORIDE, SODIUM LACTATE AND CALCIUM CHLORIDE: 600; 310; 30; 20 INJECTION, SOLUTION INTRAVENOUS at 02:02

## 2021-11-04 RX ADMIN — SUGAMMADEX 200 MG: 100 INJECTION, SOLUTION INTRAVENOUS at 08:44

## 2021-11-04 RX ADMIN — OXYCODONE 10 MG: 5 TABLET ORAL at 10:55

## 2021-11-04 RX ADMIN — ONDANSETRON 4 MG: 2 INJECTION INTRAMUSCULAR; INTRAVENOUS at 08:03

## 2021-11-04 RX ADMIN — ROCURONIUM BROMIDE 40 MG: 10 INJECTION INTRAVENOUS at 07:53

## 2021-11-04 RX ADMIN — FENTANYL CITRATE 50 MCG: 50 INJECTION, SOLUTION INTRAMUSCULAR; INTRAVENOUS at 07:53

## 2021-11-04 RX ADMIN — SODIUM CHLORIDE, POTASSIUM CHLORIDE, SODIUM LACTATE AND CALCIUM CHLORIDE: 600; 310; 30; 20 INJECTION, SOLUTION INTRAVENOUS at 08:13

## 2021-11-04 RX ADMIN — HYDROMORPHONE HYDROCHLORIDE 0.5 MG: 1 INJECTION, SOLUTION INTRAMUSCULAR; INTRAVENOUS; SUBCUTANEOUS at 03:20

## 2021-11-04 ASSESSMENT — PULMONARY FUNCTION TESTS
PIF_VALUE: 13
PIF_VALUE: 17
PIF_VALUE: 15
PIF_VALUE: 15
PIF_VALUE: 13
PIF_VALUE: 22
PIF_VALUE: 6
PIF_VALUE: 14
PIF_VALUE: 20
PIF_VALUE: 22
PIF_VALUE: 13
PIF_VALUE: 14
PIF_VALUE: 13
PIF_VALUE: 17
PIF_VALUE: 20
PIF_VALUE: 15
PIF_VALUE: 16
PIF_VALUE: 13
PIF_VALUE: 22
PIF_VALUE: 15
PIF_VALUE: 17
PIF_VALUE: 15
PIF_VALUE: 13
PIF_VALUE: 20
PIF_VALUE: 15
PIF_VALUE: 15
PIF_VALUE: 21
PIF_VALUE: 22
PIF_VALUE: 15
PIF_VALUE: 26
PIF_VALUE: 14
PIF_VALUE: 22
PIF_VALUE: 14
PIF_VALUE: 17
PIF_VALUE: 22
PIF_VALUE: 22
PIF_VALUE: 15
PIF_VALUE: 22
PIF_VALUE: 22
PIF_VALUE: 15
PIF_VALUE: 22
PIF_VALUE: 13
PIF_VALUE: 22
PIF_VALUE: 22
PIF_VALUE: 14
PIF_VALUE: 16
PIF_VALUE: 14
PIF_VALUE: 22
PIF_VALUE: 1
PIF_VALUE: 14
PIF_VALUE: 18
PIF_VALUE: 22
PIF_VALUE: 16
PIF_VALUE: 22
PIF_VALUE: 21

## 2021-11-04 ASSESSMENT — PAIN DESCRIPTION - PROGRESSION
CLINICAL_PROGRESSION: GRADUALLY WORSENING
CLINICAL_PROGRESSION: GRADUALLY WORSENING

## 2021-11-04 ASSESSMENT — PAIN - FUNCTIONAL ASSESSMENT
PAIN_FUNCTIONAL_ASSESSMENT: ACTIVITIES ARE NOT PREVENTED
PAIN_FUNCTIONAL_ASSESSMENT: ACTIVITIES ARE NOT PREVENTED

## 2021-11-04 ASSESSMENT — PAIN DESCRIPTION - FREQUENCY
FREQUENCY: CONTINUOUS
FREQUENCY: CONTINUOUS

## 2021-11-04 ASSESSMENT — PAIN DESCRIPTION - LOCATION
LOCATION: ABDOMEN
LOCATION: ABDOMEN

## 2021-11-04 ASSESSMENT — PAIN SCALES - GENERAL
PAINLEVEL_OUTOF10: 4
PAINLEVEL_OUTOF10: 0
PAINLEVEL_OUTOF10: 5
PAINLEVEL_OUTOF10: 7
PAINLEVEL_OUTOF10: 8
PAINLEVEL_OUTOF10: 8
PAINLEVEL_OUTOF10: 4

## 2021-11-04 ASSESSMENT — PAIN DESCRIPTION - ORIENTATION
ORIENTATION: MID;RIGHT
ORIENTATION: RIGHT;MID

## 2021-11-04 ASSESSMENT — PAIN DESCRIPTION - PAIN TYPE
TYPE: ACUTE PAIN
TYPE: ACUTE PAIN

## 2021-11-04 ASSESSMENT — PAIN DESCRIPTION - ONSET
ONSET: ON-GOING
ONSET: ON-GOING

## 2021-11-04 ASSESSMENT — LIFESTYLE VARIABLES: SMOKING_STATUS: 0

## 2021-11-04 ASSESSMENT — PAIN DESCRIPTION - DESCRIPTORS
DESCRIPTORS: STABBING;SHARP
DESCRIPTORS: STABBING;SHARP

## 2021-11-04 NOTE — CARE COORDINATION
Case Management Assessment            Discharge Note                    Date / Time of Note: 11/4/2021 2:12 PM                  Discharge Note Completed by: BEN Qureshi, MAURICEW    Patient Name: Bolivar Avendaño   YOB: 1985  Diagnosis: Acute appendicitis without peritonitis [K35.80]  Acute appendicitis [K35.80]   Date / Time: 11/3/2021  6:19 PM    Current PCP: Josue Rosales patient: No    Hospitalization in the last 30 days: No    Advance Directives:  Code Status: Full Code  [place] DNR form completed and on chart: Not Indicated    Financial:  Payor: Ky Caraballo / Plan: Amadeo Enriquez PPO / Product Type: *No Product type* /      Pharmacy:    [facility]/pharmacy #[id number]- [address] - P [phone] - F [phone]  [address] [place] [address]  Phone: [phone] Fax: [phone]      Assistance purchasing medications?: Potential Assistance Purchasing Medications: No  Assistance provided by Case Management: None at this time    Does patient want to participate in local refill/ meds to beds program?: No    Meds To Beds General Rules:  1. Can ONLY be done Monday- Friday between 8:30am-5pm  2. Prescription(s) must be in pharmacy by 3pm to be filled same day  3. Copy of patient's insurance/ prescription drug card and patient face sheet must be sent along with the prescription(s)  4. Cost of Rx cannot be added to hospital bill. If financial assistance is needed, please contact unit  or ;  or  CANNOT provide pharmacy voucher for patients co-pays  5.  Patients can then  the prescription on their way out of the hospital at discharge, or pharmacy can deliver to the bedside if staff is available. (payment due at time of pick-up or delivery - cash, check, or card accepted)     Able to afford home medications/ co-pay costs: Yes    ADLS:  Current PT AM-PAC Score:   /24  Current OT AM-PAC Score:   /24      DISCHARGE Disposition: Home- No Services Needed    LOC at discharge: Not Applicable  SRIDEVI Completed: Yes    Notification completed in HENS/PAS?:  Not Applicable    IMM Completed:   Not Indicated    Transportation:  Transportation PLAN for discharge: family   Mode of Transport: Private Car  Reason for medical transport: Not Applicable  Name of Christine Adame,[address]: Not Applicable      Transport form completed: No    Home Care:  [address] ordered at discharge: No      Referrals made at [facility] for outpatient continued care:  Not Applicable    Additional CM Notes: SW met with patient and her boyfriend at [facility]. SW double checked with patient, patient confirmed she doesn't need additional services. Patient is to discharge home with boyfriend, he will provide transportation. The Plan for Transition of Care is related to the following treatment goals of Acute appendicitis without peritonitis [K35.80]  Acute appendicitis [K35.80]    The Patient and/or patient representative Ara Guo and her family were provided with a choice of provider and agrees with the discharge plan Yes    Freedom of choice list was provided with basic dialogue that supports the patient's individualized plan of care/goals and shares the quality data associated with the providers.  Yes    Care Transitions patient: No    Ivonne Dakins, MSW, [facility]  Case Management Department  Ph: [phone]

## 2021-11-04 NOTE — PROGRESS NOTES
Pt returned from surgery. Report received from [facility], [facility].

## 2021-11-04 NOTE — DISCHARGE SUMMARY
Patient:  [place]    Admit Date: 11/3/2021  6:19 PM    Discharge Date: 11/4/2021    Admitting Physician: Claire Schmid MD     Discharge Physician: same    Admitting Diagnosis:  Acute appendicitis    Discharge Diagnosis: same     History reviewed. No pertinent past medical history. Indication for Admission:   53-year-old who presented with acute appendicitis    Hospital Course:   After presentation to ED patient under went CT scan suggestive of early appendicitis. She was made NPO, begun on IVF, IV antibiotics and IV pain control. She then underwent a laparoscopic appendectomy without complication and findings of acute non-perforated appendicitis. Post procedure she was begun on a regular diet without nausea or emesis. Her pain was controlled on PO pain medication. As she was ambulating with minimal discomfort, tolerating a regular diet without nausea and emesis and remained hemodynamically stable and afebrile she was set up for d/c home.      Procedures:  Laparoscopic appendectomy    Consulting services:  None    Discharge physical exam:  Vitals:     11/04/21 0900 11/04/21 0902 11/04/21 0915 11/04/21 0943   BP:   119/76 120/76 116/70   Pulse: 81 79 74 77   Resp: 19 13 13 14   Temp:   97.2 °F (36.2 °C) 98.9 °F (37.2 °C) 98 °F (36.7 °C)   TempSrc:   Temporal Temporal Oral   SpO2: 100% 98% 96% 98%   Weight:           Height:                    General appearance: alert, no acute distress, grooming appropriate  Eyes: No scleral icterus, EOM grossly intact  Neck: trachea midline, no JVD, no lymphadenopathy, neck supple  Chest/Lungs: Normal effort with no accessory muscle use on RA  Cardiovascular: RRR, well perfused  Abdomen: Soft, appropriately-tender, incisions c/d/i and well approximated with Dermabond  Skin: warm and dry, no rashes  Extremities: no edema, no cyanosis  Neuro: A&Ox3, no focal deficits, sensation intact      Disposition:  home    Condition at discharge:  Stable    Discharge Instructions:  See separate form    Patient Instructions:      Medication List      START taking these medications    docusate sodium 100 MG capsule  Commonly known as: Colace  Take 1 capsule by mouth 2 times daily     oxyCODONE 5 MG immediate release tablet  Commonly known as: Roxicodone  Take 1 tablet by mouth every 6 hours as needed for Pain for up to 7 days. Intended supply: 3 days.  Take lowest dose possible to manage pain        ASK your doctor about these medications    TRAZODONE HCL PO     UNKNOWN TO PATIENT           Where to Get Your Medications      You can get these medications from any pharmacy    Bring a paper prescription for each of these medications  · docusate sodium 100 MG capsule  · oxyCODONE 5 MG immediate release tablet         Gilberto Rodriguez DO  PGY-1, General Surgery  12/03/21  10:30 PM  [phone]

## 2021-11-04 NOTE — ED NOTES
Report called to [facility]YCH[facility]ATRY [facility] ADDICTIVE MED RN at [facility].       Yasemin Giles RN  11/03/21 9939

## 2021-11-04 NOTE — CARE COORDINATION
for patients co-pays  5. Patients can then  the prescription on their way out of the hospital at discharge, or pharmacy can deliver to the bedside if staff is available. (payment due at time of pick-up or delivery - cash, check, or card accepted)     Able to afford home medications/ co-pay costs: Yes    ADLS  Support Systems: Spouse/Significant Other, Family Members    PT AM-PAC:   /24  OT AM-PAC:   /24    [place]: Lives in a 2 floor house with significant other  Steps: A few to get in, and standard amount to get upstairs    Plans to RETURN to current housing: Yes  Barriers to RETURNING to current housing: medical clearance      DISCHARGE PLAN:  Disposition: Home- No Services Needed    Transportation PLAN for discharge: friend     Factors facilitating achievement of predicted outcomes: Family support, Friend support, Motivated, Cooperative, Pleasant, Sense of humor, Good insight into deficits and Has homemaker services    Barriers to discharge: Medical complications    Additional Case Management Notes: SW met with patient and boyfriend at bedside. Patient is from home w/boyfriend and plans to return home, her boyfriend will transport her at DC. Patient is hoping to go home today. Patient is waiting for medical clearance. Patient has no other service needs. SW will follow up prior to DC. The Plan for Transition of Care is related to the following treatment goals of Acute appendicitis without peritonitis [K35.80]  Acute appendicitis [K35.80]    The Patient and/or patient representative Steve Santos and her family were provided with a choice of provider and agrees with the discharge plan Yes    Freedom of choice list was provided with basic dialogue that supports the patient's individualized plan of care/goals and shares the quality data associated with the providers.  Yes    Care Transition patient: No    BEN Payne, MAURICEW  [facility] [employer]  Case Management Department  Ph: [phone]

## 2021-11-04 NOTE — OP NOTE
Operative Note      Patient: Diane Neumann  YOB: 1985  MRN: [id number]    Date of Procedure: 11/4/2021    Pre-Op Diagnosis: acute appendicitis    Post-Op Diagnosis: Same    Procedure: laparoscopic appendectomy    Surgeon(s):  Queenie Briggs MD    Assistant:   Resident: Jose Ordoñez MD    Anesthesia: General    Estimated Blood Loss (mL): Minimal    Complications: None    Specimens:   ID Type Source Tests Collected by Time Destination   A : APPENDIX  Tissue Tissue SURGICAL PATHOLOGY Queenie Briggs MD 11/4/2021 0840        Implants:  * No implants in log *      Drains: * No LDAs found *    Findings: acutely inflamed appendix, non-perforated       INDICATIONS: The patient is a 28-year-old female who presented with acute appendicitis. The risks, benefits, and alternatives of procedure were explained to the patient including risks of bleeding, infection. Patient understood all of these risks and agreed to  proceed. Typical postoperative course was discussed. DETAILS OF PROCEDURE: After informed consent was obtained the patient was taken to the operating room and placed in the supine position. SCDs boots were applied to bilateral lower extremities prior to the induction of general anaesthetic. The abdomen was prepped and draped in standard sterile fashion. After infiltration with local anaesthetic a 1 cm supraumbilical incision was made. Using optiview technique a 5mm port and scope was advanced into the peritoneal cavity. Once position confirmed insufflation was achieved to 15mmHg. No injuries from initial trocar placement were noted. Under direct visualization a 12 mm port was placed in the left lower quadrant lateral to the rectus muscle. Care was taken to avoid the bladder and inferior epigastric vessels. One further 5 mm port was placed in the right upper quadrant. The cecum was then gently grasped and pulled toward the left upper quadrant.  Using sharp and blunt dissection, adhesions were taken down  between the appendix, cecum and terminal ileum. Once freed the appendix was grasped and elevated. A Ligasure device was used to transect the mesoappendix down to the base. An endoscopic linear cutting stapler was then used to divide and staple the base of the appendix. An Endocatch bag was inserted and the appendix removed through the 12 mm port site. The appendiceal stump was then irrigated and hemostasis assured. Trocars were then removed under direct visualization. No bleeding was noted. Pneumoperitonum was allowed to be evacuated. The 5 mm ports were closed with interrupted 4-0 monocryl stitch. The left lower quadrant site was closed with an 0-Vicryl suture in a figure of eight fashion. The skin was closed with 4-0 monocryl stitch. Dermabond was then applied. At the end of the procedure all sponge, needle and instrument counts were reported to be correct. The patient tolerated the procedure well, was woken up and brought to the PACU in stable condition. All counts were correct x2 at the end of the procedure. No complications. Dr. James Mendez was present and scrubbed for the entire case.     Electronically signed by Jeannette Zuluaga MD on 11/4/2021 at 8:47 AM

## 2021-11-04 NOTE — PROGRESS NOTES
General Surgery  Post-operative Note      Procedure(s) Performed: laparoscopic appendectomy    Subjective:   Patient reports pain is well-controlled with medications. Patient denies any nausea or vomiting. Patient has not had solid yet and is waiting for lunch order to arrive; states she is hungry. Patient has been OOB and ambulating. Patient has voided. Denies flatus or BM at this time. Objective:  Anesthesia type: General      I/O    Intra op    Post op     Fluids  400 mL 0 mL     EBL 40 mL 0 mL     Urine 0 mL 1x      Vitals:   Vitals:    11/04/21 0900 11/04/21 0902 11/04/21 0915 11/04/21 0943   BP:  119/76 120/76 116/70   Pulse: 81 79 74 77   Resp: 19 13 13 14   Temp:  97.2 °F (36.2 °C) 98.9 °F (37.2 °C) 98 °F (36.7 °C)   TempSrc:  Temporal Temporal Oral   SpO2: 100% 98% 96% 98%   Weight:       Height:           Physical Exam:  Post-op vital signs:  Stable   General appearance: alert, no acute distress, grooming appropriate  Eyes: No scleral icterus, EOM grossly intact  Neck: trachea midline, no JVD, no lymphadenopathy, neck supple  Chest/Lungs: Normal effort with no accessory muscle use on RA  Cardiovascular: RRR, well perfused  Abdomen: Soft, appropriately-tender, incisions c/d/i and well approximated with Dermabond  Skin: warm and dry, no rashes  Extremities: no edema, no cyanosis  Neuro: A&Ox3, no focal deficits, sensation intact    Assessment and Plan  This is a 31y.o. year old female status post laparoscopic appendectomy secondary to acute appendicitis. (11/4) POD0.     Pain management: Roxicodone pain panel and scheduled tylenol  Cardiovasc: hemodynamically stable, will continue to monitor  Respiratory:  IS ordered to bedside, encourage hourly IS and deep breathing, wean oxygen as tolerated  Fluids:  none, Diet: General diet  : Urine output is adequate   Ambulation: OOB to chair, encourage ambulation  Prophylaxis: SCDs, lovenox  Antibiotics: Zosyn given periop  Wound: Local wound care      Noretta Houston Shakeel 26, DO  PGY1, General Surgery  11/04/21  12:13 PM  [phone]

## 2021-11-04 NOTE — DISCHARGE INSTR - COC
Continuity of Care Form    Patient Name: Diane Neumann   :  1985  MRN:  [id number]    Admit date:  11/3/2021  Discharge date:  ***    Code Status Order: Full Code   Advance Directives:     Admitting Physician:  Queenie Briggs MD  PCP: Francesca Alanis    Discharging Nurse: [facility] Unit/Room#: 1326/6910-83  Discharging Unit Phone Number: ***    Emergency Contact:   Extended Emergency Contact Information  Primary Emergency Contact: amanda resendez  Home Phone: [phone]  Relation: None    Past Surgical History:  Past Surgical History:   Procedure Laterality Date    LAPAROSCOPIC APPENDECTOMY N/A 2021    APPENDECTOMY LAPAROSCOPIC, POSSIBLE OPEN, POSSIBLE UMBILICAL HERNIA REPAIR performed by Queenie Briggs MD at [facility] OR       Immunization History:   Immunization History   Administered Date(s) Administered    COVID-19, Vasquez Peter, PF, 30mcg/0.3mL 2021, 2021       Active Problems:  Patient Active Problem List   Diagnosis Code    Acute appendicitis K35.80       Isolation/Infection:   Isolation          No Isolation        Patient Infection Status     None to display          Nurse Assessment:  Last Vital Signs: /70   Pulse 77   Temp 98 °F (36.7 °C) (Oral)   Resp 14   Ht 5' 8\" (1.727 m)   Wt 139 lb 12.4 oz (63.4 kg)   LMP 2021 (Approximate) Comment: Only has cycle q 3 months due to BCP  SpO2 98%   BMI 21.25 kg/m²     Last documented pain score (0-10 scale): Pain Level: 7  Last Weight:   Wt Readings from Last 1 Encounters:   21 139 lb 12.4 oz (63.4 kg)     Mental Status:  {IP PT MENTAL STATUS:}    IV Access:  { SRIDEVI IV ACCESS:620263678}    Nursing Mobility/ADLs:  Walking   {CHP DME QSFP:963714173}  Transfer  {P DME ZJKA:156217829}  Bathing  {CHP DME QZG}  Dressing  {CHP DME LSUK:656037602}  Toileting  {CHP DME WGSY:780979558}  Feeding  {P DME PQSO:589986624}  Med Admin  {P DME LZMN:076644874}  Med Delivery   { SRIDEVI MED Delivery:865137499}    Wound Care Documentation and Therapy:        Elimination:  Continence:   · Bowel: {YES / SV:77909}  · Bladder: {YES / WQ:57451}  Urinary Catheter: {Urinary Catheter:210429760}   Colostomy/Ileostomy/Ileal Conduit: {YES / FS:99186}       Date of Last BM: ***    Intake/Output Summary (Last 24 hours) at 2021 1412  Last data filed at 2021 0853  Gross per 24 hour   Intake 400 ml   Output 420 ml   Net -20 ml     I/O last 3 completed shifts:   In: 0   Out: 400 [Urine:400]    Safety Concerns:     508 MD On-Line Safety Concerns:450684024}    Impairments/Disabilities:      508 MD On-Line Impairments/Disabilities:782495443}    Nutrition Therapy:  Current Nutrition Therapy:   508 MD On-Line Diet List:219164479}    Routes of Feeding: {CHP DME Other Feedings:510626541}  Liquids: {Slp liquid thickness:12559}  Daily Fluid Restriction: {CHP DME Yes amt example:353795645}  Last Modified Barium Swallow with Video (Video Swallowing Test): {Done Not Done FGLK:683965191}    Treatments at the Time of Hospital Discharge:   Respiratory Treatments: ***  Oxygen Therapy:  {Therapy; copd oxygen:11735}  Ventilator:    { CC Vent FED}    Rehab Therapies: {THERAPEUTIC INTERVENTION:7217872903}  Weight Bearing Status/Restrictions: [facility] Weight Bearin}  Other Medical Equipment (for information only, NOT a DME order):  {EQUIPMENT:360204478}  Other Treatments: ***    Patient's personal belongings (please select all that are sent with patient):  {CHP DME Belongings:020776960}    RN SIGNATURE:  {Esignature:204655628}    CASE MANAGEMENT/SOCIAL WORK SECTION    Inpatient Status Date: 2021    Readmission Risk Assessment Score:  Readmission Risk              Risk of Unplanned Readmission:  0           Discharging to Facility/ Agency   · Name: N/A  · Address:  · Phone:  · Fax:      / signature: Electronically signed by BEN Qureshi LSW on 21 at 2:13 PM EDT    PHYSICIAN SECTION    Prognosis: {Prognosis:8726251670}    Condition at Discharge: Iona Thorpe Patient Condition:112155050}    Rehab Potential (if transferring to Rehab): {Prognosis:1758839336}    Recommended Labs or Other Treatments After Discharge: ***    Physician Certification: I certify the above information and transfer of Fernandez Alanis  is necessary for the continuing treatment of the diagnosis listed and that she requires {Admit to Appropriate Level of Care:59376} for {GREATER/LESS:636585576} 30 days.      Update Admission H&P: {CHP DME Changes in CF}    PHYSICIAN SIGNATURE:  {Esignature:293478164}

## 2021-11-04 NOTE — BRIEF OP NOTE
Brief Postoperative Note      Patient: Sandra Castillo  YOB: 1985  MRN: [id number]    Date of Procedure: 11/4/2021    Pre-Op Diagnosis: acute appendicitis    Post-Op Diagnosis: Same       Procedure: laparoscopic appendectomy    Surgeon(s):  Mateo Ortega MD    Assistant:  Resident: Vernette Curling, MD    Anesthesia: General    Estimated Blood Loss (mL): Minimal    Complications: None    Specimens:   ID Type Source Tests Collected by Time Destination   A : APPENDIX  Tissue Tissue SURGICAL PATHOLOGY Mateo Ortega MD 11/4/2021 0840        Implants:  * No implants in log *      Drains: * No LDAs found *    Findings: Acutely inflamed appendix, non-perforated    Electronically signed by Vernette Curling, MD on 11/4/2021 at 8:46 AM

## 2021-11-04 NOTE — ANESTHESIA POSTPROCEDURE EVALUATION
Department of Anesthesiology  Postprocedure Note    Patient: Kylee Osorio  MRN: [id number]  YOB: 1985  Date of evaluation: 11/4/2021  Time:  12:11 PM     Procedure Summary     Date: 11/04/21 Room / Location: [address] [place] [address]    Anesthesia Start: 5788 Anesthesia Stop: 0900    Procedure: APPENDECTOMY LAPAROSCOPIC, POSSIBLE OPEN, POSSIBLE UMBILICAL HERNIA REPAIR (N/A Abdomen) Diagnosis:       Acute appendicitis, unspecified acute appendicitis type      (acute appendicitis)    Surgeons: Bartolome Hilton MD Responsible Provider: Ventura Desai DO    Anesthesia Type: general ASA Status: 1 - Emergent          Anesthesia Type: general    Helen Phase I: Helen Score: 9    Helen Phase II:      Last vitals: Reviewed and per EMR flowsheets.        Anesthesia Post Evaluation    Patient location during evaluation: PACU  Patient participation: complete - patient participated  Level of consciousness: awake and alert  Pain score: 4  Airway patency: patent  Nausea & Vomiting: no nausea and no vomiting  Complications: no  Cardiovascular status: hemodynamically stable  Respiratory status: acceptable  Hydration status: stable

## 2021-11-04 NOTE — ANESTHESIA PRE PROCEDURE
Department of Anesthesiology  Preprocedure Note       Name:  Rakesh Burdick   Age:  31 y.o.  :  1985                                          MRN:  [id number]         Date:  2021      Surgeon: Kayce Pierce):  Marc Ledbetter MD    Procedure: Procedure(s):  APPENDECTOMY LAPAROSCOPIC, POSSIBLE OPEN, POSSIBLE UMBILICAL HERNIA REPAIR    Medications prior to admission:   Prior to Admission medications    Medication Sig Start Date End Date Taking?  Authorizing Provider   TRAZODONE HCL PO Take by mouth   Yes Historical Provider, MD   UNKNOWN TO PATIENT    Yes Historical Provider, MD       Current medications:    Current Facility-Administered Medications   Medication Dose Route Frequency Provider Last Rate Last Admin    lactated ringers infusion   IntraVENous Continuous Melinda Mack  mL/hr at 21 0547 New Bag at 21 0813    magnesium sulfate 2000 mg in 50 mL IVPB premix  2,000 mg IntraVENous Once Kostas Nina MD 25 mL/hr at 21 0705 2,000 mg at 21 0705    meperidine (DEMEROL) injection 12.5 mg  12.5 mg IntraVENous Q5 Min PRN Hoa Shen, DO        HYDROmorphone (DILAUDID) injection 0.25 mg  0.25 mg IntraVENous Q5 Min PRN Hoa Shen, DO        HYDROmorphone (DILAUDID) injection 0.5 mg  0.5 mg IntraVENous Q5 Min PRN Hoa Shen, DO        HYDROcodone-acetaminophen [facility]) 5-325 MG per tablet 1 tablet  1 tablet Oral Once PRN Hoa Shen, DO        ondansetron [facility]) injection 4 mg  4 mg IntraVENous Once PRN Hoa Shen, DO        prochlorperazine (COMPAZINE) injection 5 mg  5 mg IntraVENous Once PRN Hoa Shen, DO        0.9 % sodium chloride bolus  500 mL IntraVENous Once PRN Hoa Shen, DO        diphenhydrAMINE (BENADRYL) injection 12.5 mg  12.5 mg IntraVENous Once PRN Hoa Shen, DO        hydrALAZINE (APRESOLINE) injection 5 mg  5 mg IntraVENous Q10 Min PRN Hoa Shen, DO        sodium chloride 0.9 % irrigation Continuous PRN Juan Carlos Morejon MD   1,000 mL at 11/04/21 0808    influenza quadrivalent split vaccine (FLUZONE;FLUARIX;FLULAVAL;AFLURIA) injection 0.5 mL  0.5 mL IntraMUSCular Prior to discharge Juan Carlos Morejon MD        sodium chloride flush 0.9 % injection 5-40 mL  5-40 mL IntraVENous 2 times per day Marylin Newman MD        sodium chloride flush 0.9 % injection 5-40 mL  5-40 mL IntraVENous PRN Marylin Newman MD        0.9 % sodium chloride infusion  25 mL IntraVENous PRN Marylin Newman MD   Stopped at 11/04/21 0548    ondansetron (ZOFRAN-ODT) disintegrating tablet 4 mg  4 mg Oral Q8H PRN Marylin Newman MD        Or    ondansetron (ZOFRAN) injection 4 mg  4 mg IntraVENous Q6H PRN Marylin Newman MD        enoxaparin (LOVENOX) injection 40 mg  40 mg SubCUTAneous Daily Marylin Newman MD        HYDROmorphone (DILAUDID) injection 0.25 mg  0.25 mg IntraVENous Q3H PRN Marylin Newman MD        Or    HYDROmorphone (DILAUDID) injection 0.5 mg  0.5 mg IntraVENous Q3H PRN Marylin Newman MD   0.5 mg at 11/04/21 0657    piperacillin-tazobactam (ZOSYN) 3,375 mg in dextrose 5 % 50 mL IVPB (mini-bag)  3,375 mg IntraVENous Q8H Marylin Newman MD   Stopped at 11/04/21 0547     Facility-Administered Medications Ordered in Other Encounters   Medication Dose Route Frequency Provider Last Rate Last Admin    propofol injection   IntraVENous PRN Selena Kmaeron, APRN - CRNA   200 mg at 11/04/21 0753    rocuronium (ZEMURON) injection   IntraVENous PRN Selena Kameron, APRN - CRNA   40 mg at 11/04/21 0753    fentaNYL (SUBLIMAZE) injection   IntraVENous PRN Selena Kameron, APRN - CRNA   50 mcg at 11/04/21 0753    midazolam (VERSED) injection   IntraVENous PRN Selena Kameron, APRN - CRNA   2 mg at 11/04/21 0753       Allergies:  No Known Allergies    Problem List:    Patient Active Problem List   Diagnosis Code    Acute appendicitis K35.80       Past Medical History:  History reviewed.  No pertinent past medical history. Past Surgical History:  History reviewed. No pertinent surgical history. Social History:    Social History     Tobacco Use    Smoking status: Never Smoker    Smokeless tobacco: Never Used   Substance Use Topics    Alcohol use: Not on file                                Counseling given: Not Answered      Vital Signs (Current):   Vitals:    11/03/21 2015 11/03/21 2115 11/03/21 2245 11/04/21 0320   BP: 120/64 109/61 120/74 120/75   Pulse: 65 68 62 56   Resp: 14 12 14 15   Temp:   98.3 °F (36.8 °C) 98.1 °F (36.7 °C)   TempSrc:   Oral Oral   SpO2: 99% 99% 95% 97%   Weight:   139 lb 12.4 oz (63.4 kg)    Height:   5' 8\" (1.727 m)                                               BP Readings from Last 3 Encounters:   11/04/21 120/75   11/04/21 (!) 92/53       NPO Status: Time of last liquid consumption: 1400                        Time of last solid consumption: 1400                                                      BMI:   Wt Readings from Last 3 Encounters:   11/03/21 139 lb 12.4 oz (63.4 kg)     Body mass index is 21.25 kg/m². CBC:   Lab Results   Component Value Date    WBC 12.9 11/04/2021    RBC 4.06 11/04/2021    HGB 12.2 11/04/2021    HCT 36.6 11/04/2021    MCV 90.2 11/04/2021    RDW 13.9 11/04/2021     11/04/2021       CMP:   Lab Results   Component Value Date     11/04/2021    K 4.3 11/04/2021     11/04/2021    CO2 26 11/04/2021    BUN 8 11/04/2021    CREATININE 0.8 11/04/2021    GFRAA >60 11/04/2021    AGRATIO 1.4 11/03/2021    LABGLOM >60 11/04/2021    GLUCOSE 102 11/04/2021    PROT 7.5 11/03/2021    CALCIUM 8.9 11/04/2021    BILITOT 0.3 11/03/2021    ALKPHOS 49 11/03/2021    AST 15 11/03/2021    ALT 15 11/03/2021       POC Tests: No results for input(s): POCGLU, POCNA, POCK, POCCL, POCBUN, POCHEMO, POCHCT in the last 72 hours.     Coags:   Lab Results   Component Value Date    PROTIME 11.6 11/04/2021    INR 1.03 11/04/2021       HCG (If Applicable):   Lab Results   Component Value Date    PREGTESTUR Negative 11/04/2021        ABGs: No results found for: PHART, PO2ART, OCB3XEC, VXM6RMM, BEART, A9LJGSRN     Type & Screen (If Applicable):  No results found for: LABABO, LABRH    Drug/Infectious Status (If Applicable):  No results found for: HIV, HEPCAB    COVID-19 Screening (If Applicable): No results found for: COVID19        Anesthesia Evaluation  Patient summary reviewed and Nursing notes reviewed no history of anesthetic complications:   Airway: Mallampati: I  TM distance: >3 FB   Neck ROM: full  Mouth opening: > = 3 FB Dental: normal exam         Pulmonary: breath sounds clear to auscultation      (-) not a current smoker (never)                           Cardiovascular:  Exercise tolerance: good (>4 METS),       (-) past MI    NYHA Classification: I    Rhythm: regular  Rate: normal           Beta Blocker:  Not on Beta Blocker         Neuro/Psych:   Negative Neuro/Psych ROS              GI/Hepatic/Renal:        (-) GERD       Endo/Other: Negative Endo/Other ROS                    Abdominal:             Vascular: negative vascular ROS. Other Findings:             Anesthesia Plan      general     ASA 1 - emergent       Induction: intravenous. MIPS: Postoperative opioids intended and Prophylactic antiemetics administered. Anesthetic plan and risks discussed with patient. Plan discussed with CRNA.     Attending anesthesiologist reviewed and agrees with Preprocedure content              Felicita Hayden DO   11/4/2021

## 2021-11-04 NOTE — H&P
Systems:     Constitutional: Negative. HENT: Negative. Eyes: Negative. Respiratory: Negative. Cardiovascular: Negative. Gastrointestinal: +abdominal pain, +n/v  Genitourinary: Negative. Musculoskeletal: Negative. Skin: Negative. Endocrine: Negative. Allergic/Immunologic: Negative. Neurological: Negative. Hematological: Negative. Psychiatric/Behavioral: Negative. Physical exam:    Vitals:    11/03/21 1839 11/03/21 2015 11/03/21 2115 11/03/21 2245   BP: 121/64 120/64 109/61 120/74   Pulse: 63 65 68 62   Resp: 14 14 12 14   Temp: 98.3 °F (36.8 °C)   98.3 °F (36.8 °C)   TempSrc: Oral   Oral   SpO2: 100% 99% 99% 95%   Weight: 138 lb 3.2 oz (62.7 kg)   139 lb 12.4 oz (63.4 kg)   Height: 5' 8\" (1.727 m)   5' 8\" (1.727 m)       General appearance: alert, no acute distress, grooming appropriate  HEENT: Normocephalic, atraumatic; EOMI; moist mucous membranes  Neck: trachea midline, no JVD, no lymphadenopathy  Chest/Lungs: Normal inspiratory effort, symmetric chest rise, no accessory muscle use  Cardiovascular: Regular rate and rhythm; perfusing extremities  Abdomen: soft, moderate right sided abdominal tenderness, non-distended, no guarding/rigidity, small umbilical hernia palpable  Skin: warm and dry, no rashes  Extremities: no edema, no cyanosis  Neuro: A&Ox3, no gross sensory or motor neuro deficits    Labs:    CBC:   Recent Labs     11/03/21 1914   WBC 15.2*   HGB 13.2   HCT 39.1   MCV 88.4        BMP:   Recent Labs     11/03/21 1914      K 3.8      CO2 27   BUN 12   CREATININE 0.8     Liver Profile:   Lab Results   Component Value Date    AST 15 11/03/2021    ALT 15 11/03/2021    BILITOT 0.3 11/03/2021    ALKPHOS 49 11/03/2021   No results found for: CHOL, HDL, TRIG  PT/INR: No results for input(s): PROTIME, INR in the last 72 hours. Imaging:   CT ABDOMEN PELVIS W IV CONTRAST Additional Contrast? None   Final Result      1. Evidence of appendicitis.    2. Retroverted uterus with prominent thickened endometrium. 3. Trace pelvic fluid. Assessment/Plan:  Sandra Castillo is a 31 y.o. female who presents as a direct admit from [address] [place] [address] for acute appendicitis. On presentation the patient is AF, HDS. Exam with RLQ/periumbilical tenderness, no rebound. Labs notable for leukocytosis 15.2. CT abdomen pelvis showing mildly dilated appendix measuring 9 mm.    -Patient consented for laparoscopic appendectomy, possible umbilical hernia repair  -NPO/mIVF  -zosyn q8h  -MMT pain control   -pre op workup completed  -patient discussed with Rita Flanagan MD, PGY-1  11/03/21 11:46 PM  Pager: [phone]    I have seen, examined, and reviewed the patients chart. I agree with the residents assessment and have made appropriate changes.     Ryan Jay

## 2021-11-04 NOTE — PLAN OF CARE
Problem: Pain:  Goal: Pain level will decrease  Description: Pain level will decrease  11/3/2021 2316 by London Stewart RN  Outcome: Ongoing  Note: Pt c/o abd pain 8/10 in R mid/lower and upper abd. Awaiting pain medication orders. Will cont to monitor.    11/3/2021 2315 by London Stewart RN  Outcome: Ongoing  Goal: Control of acute pain  Description: Control of acute pain  11/3/2021 2316 by London Stewart RN  Outcome: Ongoing  11/3/2021 2315 by London Stewart RN  Outcome: Ongoing  Goal: Control of chronic pain  Description: Control of chronic pain  11/3/2021 2316 by London Stewart RN  Outcome: Ongoing  11/3/2021 2315 by London Stewart RN  Outcome: Ongoing

## 2021-11-04 NOTE — PROGRESS NOTES
Received to room 6307 from [address] [place] [address]. A&O x4 upon arrival. VSS. Pt c/o 8/10 abd pain in upper/right lower abd. Awaiting pain medication orders. Oriented to room, staff, and call system. Ambulates independently with steady gait. Assessment as documented. Bed locked in low position. Informed to utilize call light with any needs, verbalized understanding. Call light within reach. Hourly rounding in place. Will continue to monitor.

## 2021-11-04 NOTE — PROGRESS NOTES
Pt discharged with all belongings. IV removed and after visit instructions provided. Patient wheeled down to main entrance to return home with significant other.

## 2021-11-04 NOTE — PROGRESS NOTES
PRE-OP NOTE  Department of Surgery      Chief Complaint or Reason for Surgery: acute appendicitis    Procedure: laparoscopic appendectomy, possible hernia repair  Expected time: add on    Plan  1. Diet: NPO   2. IVF:   3. Antibiotics: zosyn  4. Labs to be drawn: Type and Screen, INR  5. Anesthesia: to see patient  6. Consent: signed and in chart  7.  Pregnancy test: negative    Thania Brown MD  11/04/21  1:51 AM

## 2021-11-12 ENCOUNTER — OFFICE VISIT (OUTPATIENT)
Dept: SURGERY | Age: 36
End: 2021-11-12

## 2021-11-12 VITALS
HEART RATE: 63 BPM | HEIGHT: 68 IN | DIASTOLIC BLOOD PRESSURE: 65 MMHG | BODY MASS INDEX: 20.82 KG/M2 | WEIGHT: 137.4 LBS | TEMPERATURE: 97.7 F | SYSTOLIC BLOOD PRESSURE: 106 MMHG

## 2021-11-12 DIAGNOSIS — Z09 POSTOP CHECK: Primary | ICD-10-CM

## 2021-11-12 PROCEDURE — 99024 POSTOP FOLLOW-UP VISIT: CPT | Performed by: SURGERY

## 2021-11-18 NOTE — PROGRESS NOTES
PATIENT NAME: Froy Sam     YOB: 1985     TODAY'S DATE: 11/18/2021    Reason for Visit:  Post-op check    Requesting Physician:  Dr. Ester Begum:              The patient is a 31 y.o. female with a PMHx as delineated below who presents for follow up without complaints. Chief Complaint   Patient presents with    Post-Op Check     lap. appendectomy possible open umbilical hernia 44/8/30       REVIEW OF SYSTEMS:  CONSTITUTIONAL:  negative  HEENT:  negative  RESPIRATORY:  negative  CARDIOVASCULAR:  negative  GASTROINTESTINAL:  negative except for abdominal pain  GENITOURINARY:  negative  HEMATOLOGIC/LYMPHATIC:  negative  MUSCULOSKELETAL: negative  NEUROLOGICAL:  negative    PMH  No past medical history on file. PSH  Past Surgical History:   Procedure Laterality Date    LAPAROSCOPIC APPENDECTOMY N/A 11/4/2021    APPENDECTOMY LAPAROSCOPIC, POSSIBLE OPEN, POSSIBLE UMBILICAL HERNIA REPAIR performed by Norma Mayfield MD at [address] [place] [address]     Socioeconomic History    Marital status: Single     Spouse name: Not on file    Number of children: Not on file    Years of education: Not on file    Highest education level: Not on file   Occupational History    Not on file   Tobacco Use    Smoking status: Never Smoker    Smokeless tobacco: Never Used   Vaping Use    Vaping Use: Never used   Substance and Sexual Activity    Alcohol use: Not on file    Drug use: Never    Sexual activity: Yes     Birth control/protection: OCP   Other Topics Concern    Not on file   Social History Narrative    Not on file     Social Determinants of Health     Financial Resource Strain:     Difficulty of Paying Living Expenses: Not on file   Food Insecurity:     Worried About [address] in the Last Year: Not on file    [address] in the Last Year: Not on file   Transportation Needs:     Lack of Transportation (Medical):  Not on file    Lack of Transportation (Non-Medical): Not on file   Physical Activity:     Days of Exercise per Week: Not on file    Minutes of Exercise per Session: Not on file   Stress:     Feeling of Stress : Not on file   Social Connections:     Frequency of Communication with Friends and Family: Not on file    Frequency of Social Gatherings with Friends and Family: Not on file    Attends Mosque Services: Not on file    Active Member of [address] [place] [address] or Organizations: Not on file    Attends Club or Organization Meetings: Not on file    Marital Status: Not on file   Intimate Partner Violence:     Fear of Current or Ex-Partner: Not on file    Emotionally Abused: Not on file    Physically Abused: Not on file    Sexually Abused: Not on file   Housing Stability:     Unable to Pay for Housing in the Last Year: Not on file    Number of [place] in the Last Year: Not on file    Unstable Housing in the Last Year: Not on file       Family History:   No family history on file.     Allergy: No Known Allergies    PHYSICAL EXAM:  VITALS:  /65   Pulse 63   Temp 97.7 °F (36.5 °C)   Ht 5' 8\" (1.727 m)   Wt 137 lb 6.4 oz (62.3 kg)   BMI 20.89 kg/m²     CONSTITUTIONAL:  alert, no apparent distress and normal weight  EYES:  sclera clear  ENT:  normocepalic, without obvious abnormality  NECK:  supple, symmetrical, trachea midline and no carotid bruits  LUNGS:  clear to auscultation  CARDIOVASCULAR:  regular rate and rhythm and no murmur noted  ABDOMEN:  Incisions healing well, normal bowel sounds, soft, non-distended, non-tender, voluntary guarding absent, no masses palpated and hernia absent  MUSCULOSKELETAL:  0+ pitting edema lower extremities  NEUROLOGIC:  Mental Status Exam:  Level of Alertness:   awake  Orientation:   person, place, time  SKIN:  no bruising or bleeding and normal skin color, texture, turgor    Pathology review:   Appendix:      - Acute appendicitis, transmural inflammation with acute        periappendicitis/peritonitis.      - Fibrous obliteration of the tip. IMPRESSION/RECOMMENDATIONS:    The patient is s/p laparoscopic appendectomy. The patient is recovering well from surgery and is returning to normal activities. All questions were answered and I will see the patient back in the office on a PRN basis.     Olivia Chaidez MD

## (undated) DEVICE — PUMP SUC IRR TBNG L10FT W/ HNDPC ASSEMB STRYKEFLOW 2

## (undated) DEVICE — STAPLER INT L340MM 45MM STD 12 FIRING B FRM PWR + GRIPPING

## (undated) DEVICE — TROCAR: Brand: KII SHIELDED BLADED ACCESS SYSTEM

## (undated) DEVICE — TROCAR: Brand: KII FIOS FIRST ENTRY

## (undated) DEVICE — E-Z CLEAN, NON-STICK, PTFE COATED, ELECTROSURGICAL BLADE ELECTRODE, MODIFIED EXTENDED INSULATION, 2.5 INCH (6.35 CM): Brand: MEGADYNE

## (undated) DEVICE — SEALER LAP L37CM MARYLAND JAW OPN NANO COAT MULTIFUNCTIONAL

## (undated) DEVICE — SUTURE MCRYL SZ 4-0 L27IN ABSRB UD L19MM PS-2 1/2 CIR PRIM Y426H

## (undated) DEVICE — GLOVE SURG SZ 7 L12IN FNGR THK75MIL WHT LTX POLYMER BEAD

## (undated) DEVICE — SUTURE VCRL SZ 0 L27IN ABSRB UD L26MM CT-2 1/2 CIR J270H

## (undated) DEVICE — APPLICATOR PREP 26ML 0.7% IOD POVACRYLEX 74% ISO ALC ST

## (undated) DEVICE — GENERAL LAPAROSCOPIC: Brand: MEDLINE INDUSTRIES, INC.

## (undated) DEVICE — RELOAD STPL L45MM H1-2.6MM MESENTERY THN TISS WHT GRIPPING

## (undated) DEVICE — SOLUTION INJ LR VISIV 1000ML BG

## (undated) DEVICE — KIT,ANTI FOG,W/SPONGE & FLUID,SOFT PACK: Brand: MEDLINE

## (undated) DEVICE — TOWEL,STOP FLAG GOLD N-W: Brand: MEDLINE

## (undated) DEVICE — TISSUE RETRIEVAL SYSTEM: Brand: INZII RETRIEVAL SYSTEM